# Patient Record
(demographics unavailable — no encounter records)

---

## 2025-06-10 NOTE — IMAGING
[de-identified] : General: NAD, A&Ox3 Gait: Non-antalgic, no Trendelenburg Foot Progression: neutral Knee Progression: neutral   R Hip Exam: Pain with Log Roll - negative Flexion: 110 Pain with Hyperflexion - yes FADIR - pos LYLA - neg Extension: 20 Flexion Contracture - none Prone Apprehension Test - neg Prone Rotation Test - symmetric Ischial tenderness - none Trochanteric tenderness - none   Abduction - 4 /5, pain - yes Adduction - 5 /5 Supine resisted SLR - 5 /5, pain - no Seated resisted hip flexion - 5 /5, pain - no Dorsiflexion 5/5 Plantarflexion 5/5 EHL 5/5 DP/PT 2+, foot is warm and well perfused        Leg Lengths: symmetric     [Right] : right hip with pelvis [All Views] : anteroposterior, lateral [Global overcoverage] : Global overcoverage

## 2025-06-10 NOTE — DISCUSSION/SUMMARY
[de-identified] : QI ROBERSON has R hip impingement.  They have not yet failed non-operative treatment which includes:     1.  NSAIDs - these help to calm inflammation in your hip and can relieve pain.  They should be taken as directed and with food to help avoid an upset stomach.  Consult with your primary care physician if there is any concern over whether NSAIDs are compatible with your other medications or medical conditions.   2.  Physical Therapy - physical therapy helps to build up the muscles around your hip joint which helps to stabilize the joint and your pelvis and can relieve pain.  Physical therapy will also help to improve your hip mobility and strengthen your core muscles which help to stabilize your pelvis and subsequently your hip joint.   3.  Activity Modifications - if possible, avoiding activities and positions that cause hip pain can help to reduce inflammation in your hip joint and reduce hip pain   4.  Injections - occasionally a steroid injection into your hip joint can be used to help relieve pain.   I will see them back in [ ] for recheck. Their diagnosis was explained to him in terms of taking understanding and they are in agreement with this plan.  All of their questions were answered.     The patient's current medication management of their orthopedic diagnosis was reviewed today: (1) We discussed a comprehensive treatment plan that included possible pharmaceutical management involving the use of prescription strength medications including but not limited to options such as oral Naprosyn 500mg BID, once daily Meloxicam 15 mg, or 500-650 mg Tylenol versus over the counter oral medications and topical prescription NSAID Pennsaid vs over the counter Voltaren gel. (2) There is a moderate risk of morbidity with further treatment, especially from use of prescription strength medications and possible side effects of these medications which include upset stomach with oral medications, skin reactions to topical medications and cardiac/renal issues with long term use. (3) I recommended that the patient follow-up with their medical physician to discuss any significant specific potential issues with long term medication use such as interactions with current medications or with exacerbation of underlying medical comorbidities. (4) The benefits and risks associated with use of injectable, oral or topical, prescription and over the counter anti-inflammatory medications were discussed with the patient. The patient voiced understanding of the risks including but not limited to bleeding, stroke, kidney dysfunction, heart disease, and were referred to the black box warning label for further information.   Prior to appointment and during encounter with patient extensive medical records were reviewed including but not limited to, hospital records, out patient records, imaging results, and lab data. During this appointment the patient was examined, diagnoses were discussed and explained in a face to face manner. In addition extensive time was spent reviewing aforementioned diagnostic studies. Counseling including abnormal image results, differential diagnoses, treatment options, risk and benefits, lifestyle changes, current condition, and current medications was performed. Patient's comments, questions, and concerns were address and patient verbalized understanding.

## 2025-06-10 NOTE — HISTORY OF PRESENT ILLNESS
[de-identified] : QI ROBERSON is a 41 year female here with RT  hip pain.  Pain has been present for 4 weeks  and is located anteriorly   Injury - N Mechanical symptoms - N Exacerbating factors/activities/positions - N Pain during or after activity - Y Back pain - N Radicular pain -    Previous Treatment: NSAIDs: N PT:N Activity Mods: N Surgery: N   Injections: N Date of last injection: [ ] Numbing phase relief: [ ] Steroid phase relief: [ ]     Occupation:   Sports/Recreational Activities: [ ]

## 2025-07-22 NOTE — IMAGING
[Right] : right hip with pelvis [All Views] : anteroposterior, lateral [Global overcoverage] : Global overcoverage [de-identified] : General: NAD, A&Ox3 Gait: Non-antalgic, no Trendelenburg Foot Progression: neutral Knee Progression: neutral   R Hip Exam: Pain with Log Roll - negative Flexion: 110 Pain with Hyperflexion - yes FADIR - pos LYLA - neg Extension: 20 Flexion Contracture - none Prone Apprehension Test - neg Prone Rotation Test - symmetric Ischial tenderness - none Trochanteric tenderness - none   Abduction - 4 /5, pain - yes Adduction - 5 /5 Supine resisted SLR - 5 /5, pain - no Seated resisted hip flexion - 5 /5, pain - no Dorsiflexion 5/5 Plantarflexion 5/5 EHL 5/5 DP/PT 2+, foot is warm and well perfused        Leg Lengths: symmetric

## 2025-07-22 NOTE — DISCUSSION/SUMMARY
[de-identified] : Significantly improved with PT and CSI Minimal pain today Continue PT F/u PRN All questions answered, agreeable with plan

## 2025-07-22 NOTE — HISTORY OF PRESENT ILLNESS
[de-identified] : 07/22/2025:pt is here for fu. states improvement. PT 2x a week.   QI ROBERSON is a 41 year female here with RT  hip pain.  Pain has been present for 4 weeks  and is located anteriorly   Injury - N Mechanical symptoms - N Exacerbating factors/activities/positions - N Pain during or after activity - Y Back pain - N Radicular pain -    Previous Treatment: NSAIDs: N PT:N Activity Mods: N Surgery: N   Injections: N Date of last injection: [ ] Numbing phase relief: [ ] Steroid phase relief: [ ]     Occupation:   Sports/Recreational Activities: [ ]